# Patient Record
Sex: FEMALE | Race: WHITE | URBAN - METROPOLITAN AREA
[De-identification: names, ages, dates, MRNs, and addresses within clinical notes are randomized per-mention and may not be internally consistent; named-entity substitution may affect disease eponyms.]

---

## 2017-02-27 ENCOUNTER — IMPORTED ENCOUNTER (OUTPATIENT)
Dept: URBAN - METROPOLITAN AREA CLINIC 50 | Facility: CLINIC | Age: 77
End: 2017-02-27

## 2017-03-31 ENCOUNTER — IMPORTED ENCOUNTER (OUTPATIENT)
Dept: URBAN - METROPOLITAN AREA CLINIC 50 | Facility: CLINIC | Age: 77
End: 2017-03-31

## 2020-09-09 NOTE — PATIENT DISCUSSION
CHALAZION OD: PRESCRIBED WARM COMPRESSES, EYELID SCRUBS AND DOXYCYCLINE 100MG BID PO X 2 WEEKS AND TOBRADEX SAUL QHS X 2 WEEKS.  STOP PRED

## 2020-10-14 NOTE — PATIENT DISCUSSION
CHALAZION OD: PRESCRIBED WARM COMPRESSES, EYELID SCRUBS AND  TOBRADEX SAUL QHS X 2 WEEKS. DISCUSSED RISK OF NLDO AND SCARRING DUE TO PROXIMITY TO LL CANALICULUS.  PT WISHES TO PROCEED WITH I&amp;D

## 2021-04-17 ASSESSMENT — VISUAL ACUITY
OD_CC: 20/80
OS_CC: 20/70
OS_PH: 20/40
OD_PH: 20/60

## 2021-04-17 ASSESSMENT — TONOMETRY
OS_IOP_MMHG: 15
OD_IOP_MMHG: 15

## 2023-01-27 NOTE — PATIENT DISCUSSION
Given option of updating glasses vs surgery. Patient would like to proceed with surgery due to her severity of her glare.

## 2023-01-27 NOTE — PATIENT DISCUSSION
Patient interested in advanced vision, consider [employer] OU vs [employer]nergy OD and [employer]fony OS vs [employer]ix. Patient leaning towards Panoptix. Will discuss further at [address] [facility] [address]